# Patient Record
Sex: FEMALE | Race: OTHER | HISPANIC OR LATINO
[De-identification: names, ages, dates, MRNs, and addresses within clinical notes are randomized per-mention and may not be internally consistent; named-entity substitution may affect disease eponyms.]

---

## 2018-10-21 ENCOUNTER — TRANSCRIPTION ENCOUNTER (OUTPATIENT)
Age: 43
End: 2018-10-21

## 2020-03-12 ENCOUNTER — TRANSCRIPTION ENCOUNTER (OUTPATIENT)
Age: 45
End: 2020-03-12

## 2020-10-12 ENCOUNTER — TRANSCRIPTION ENCOUNTER (OUTPATIENT)
Age: 45
End: 2020-10-12

## 2021-03-02 ENCOUNTER — TRANSCRIPTION ENCOUNTER (OUTPATIENT)
Age: 46
End: 2021-03-02

## 2021-05-07 ENCOUNTER — NON-APPOINTMENT (OUTPATIENT)
Age: 46
End: 2021-05-07

## 2021-05-07 ENCOUNTER — APPOINTMENT (OUTPATIENT)
Dept: OPHTHALMOLOGY | Facility: CLINIC | Age: 46
End: 2021-05-07
Payer: COMMERCIAL

## 2021-05-07 PROCEDURE — 99204 OFFICE O/P NEW MOD 45 MIN: CPT

## 2021-05-07 PROCEDURE — 99072 ADDL SUPL MATRL&STAF TM PHE: CPT

## 2021-05-27 ENCOUNTER — APPOINTMENT (OUTPATIENT)
Dept: OPHTHALMOLOGY | Facility: CLINIC | Age: 46
End: 2021-05-27

## 2021-06-25 ENCOUNTER — TRANSCRIPTION ENCOUNTER (OUTPATIENT)
Age: 46
End: 2021-06-25

## 2021-08-12 ENCOUNTER — TRANSCRIPTION ENCOUNTER (OUTPATIENT)
Age: 46
End: 2021-08-12

## 2021-08-25 ENCOUNTER — TRANSCRIPTION ENCOUNTER (OUTPATIENT)
Age: 46
End: 2021-08-25

## 2021-09-15 ENCOUNTER — TRANSCRIPTION ENCOUNTER (OUTPATIENT)
Age: 46
End: 2021-09-15

## 2022-08-05 ENCOUNTER — NON-APPOINTMENT (OUTPATIENT)
Age: 47
End: 2022-08-05

## 2022-08-30 PROBLEM — Z00.00 ENCOUNTER FOR PREVENTIVE HEALTH EXAMINATION: Status: ACTIVE | Noted: 2022-08-30

## 2022-09-22 ENCOUNTER — APPOINTMENT (OUTPATIENT)
Dept: OBGYN | Facility: CLINIC | Age: 47
End: 2022-09-22

## 2022-09-22 VITALS
BODY MASS INDEX: 36.88 KG/M2 | HEIGHT: 67 IN | WEIGHT: 235 LBS | DIASTOLIC BLOOD PRESSURE: 73 MMHG | SYSTOLIC BLOOD PRESSURE: 104 MMHG

## 2022-09-22 PROCEDURE — 99204 OFFICE O/P NEW MOD 45 MIN: CPT

## 2022-09-28 NOTE — PHYSICAL EXAM
[Chaperone Present] : A chaperone was present in the examining room during all aspects of the physical examination [FreeTextEntry1] : chevy [Soft] : soft [Non-tender] : non-tender [FreeTextEntry7] : 12 wks mobile

## 2022-09-28 NOTE — PLAN
[FreeTextEntry1] : Discussed the option of continued conservative observation of fibroids vs surgical removal. Treatment options of myomectomy, hysterectomy, ufe and continued observation were also outlined. A detailed discussion regarding the option of myomectomy vs hysterectomy was also held and the risks, benefits, and alternatives provided. All questions answered had neg bx to schedule TLH BS.\par During this visit 45 minutes were spent face-to-face with greater than 50% of the time dedicated to counseling.\par

## 2022-09-28 NOTE — HISTORY OF PRESENT ILLNESS
[FreeTextEntry1] : 46yo  LMP - presenting for consult for uterine fibroids. She has history of painful and heavy periods. On heavy days she uses a pad every 1 hour soaked. S/p Mirena placement which was expelled and it briefly helped before expulsion with hemorrhage and ED visit in August. Complains of fatigue and anxiety. Would like to have a hysterectomy.\par \par On OCP still bleeding (Nestrellis) \par EMBx(): proliferative endometrium\par pap smear : NILM\par \par MRI ([facility]) 22: Ut 7x7x8.6cm. Multiple uterine fibroids R posterior body 3x4.5x3.9cm with 40% submucosal extension. IM fibroid 3.2x2.6x2.0cm, IM fibroid posterior body 1.2x1.9x1.5cm. Junctional zone is 0.7cm\par \par OBHx: D&Cx2, NSVDx3\par GynHx:uterine fibroids, PCOS\par PMSHx: arthroscopic R knee ligament repair, HSV\par Meds:Zyrtex, Phentermine (irregular use), Fe, Valtrex, OCP \par All: Dust\par Soc:  and is sexually active with her ;  \par FamHx: none pertinent\par Accepts blood transfusion\par

## 2022-10-14 ENCOUNTER — NON-APPOINTMENT (OUTPATIENT)
Age: 47
End: 2022-10-14

## 2022-10-19 ENCOUNTER — OUTPATIENT (OUTPATIENT)
Dept: OUTPATIENT SERVICES | Facility: HOSPITAL | Age: 47
LOS: 1 days | End: 2022-10-19
Payer: COMMERCIAL

## 2022-10-19 VITALS
OXYGEN SATURATION: 100 % | TEMPERATURE: 98 F | SYSTOLIC BLOOD PRESSURE: 109 MMHG | RESPIRATION RATE: 18 BRPM | WEIGHT: 242.07 LBS | HEART RATE: 79 BPM | HEIGHT: 68 IN | DIASTOLIC BLOOD PRESSURE: 75 MMHG

## 2022-10-19 DIAGNOSIS — Z98.890 OTHER SPECIFIED POSTPROCEDURAL STATES: Chronic | ICD-10-CM

## 2022-10-19 DIAGNOSIS — Z97.5 PRESENCE OF (INTRAUTERINE) CONTRACEPTIVE DEVICE: Chronic | ICD-10-CM

## 2022-10-19 DIAGNOSIS — D25.9 LEIOMYOMA OF UTERUS, UNSPECIFIED: ICD-10-CM

## 2022-10-19 DIAGNOSIS — Z01.818 ENCOUNTER FOR OTHER PREPROCEDURAL EXAMINATION: ICD-10-CM

## 2022-10-19 LAB
ANION GAP SERPL CALC-SCNC: 12 MMOL/L — SIGNIFICANT CHANGE UP (ref 5–17)
BLD GP AB SCN SERPL QL: NEGATIVE — SIGNIFICANT CHANGE UP
BUN SERPL-MCNC: 12 MG/DL — SIGNIFICANT CHANGE UP (ref 7–23)
CALCIUM SERPL-MCNC: 9 MG/DL — SIGNIFICANT CHANGE UP (ref 8.4–10.5)
CHLORIDE SERPL-SCNC: 104 MMOL/L — SIGNIFICANT CHANGE UP (ref 96–108)
CO2 SERPL-SCNC: 22 MMOL/L — SIGNIFICANT CHANGE UP (ref 22–31)
CREAT SERPL-MCNC: 0.74 MG/DL — SIGNIFICANT CHANGE UP (ref 0.5–1.3)
EGFR: 102 ML/MIN/1.73M2 — SIGNIFICANT CHANGE UP
GLUCOSE SERPL-MCNC: 130 MG/DL — HIGH (ref 70–99)
HCT VFR BLD CALC: 27.6 % — LOW (ref 34.5–45)
HGB BLD-MCNC: 8.6 G/DL — LOW (ref 11.5–15.5)
MCHC RBC-ENTMCNC: 27.4 PG — SIGNIFICANT CHANGE UP (ref 27–34)
MCHC RBC-ENTMCNC: 31.2 GM/DL — LOW (ref 32–36)
MCV RBC AUTO: 87.9 FL — SIGNIFICANT CHANGE UP (ref 80–100)
NRBC # BLD: 0 /100 WBCS — SIGNIFICANT CHANGE UP (ref 0–0)
PLATELET # BLD AUTO: 328 K/UL — SIGNIFICANT CHANGE UP (ref 150–400)
POTASSIUM SERPL-MCNC: 3.9 MMOL/L — SIGNIFICANT CHANGE UP (ref 3.5–5.3)
POTASSIUM SERPL-SCNC: 3.9 MMOL/L — SIGNIFICANT CHANGE UP (ref 3.5–5.3)
RBC # BLD: 3.14 M/UL — LOW (ref 3.8–5.2)
RBC # FLD: 13.1 % — SIGNIFICANT CHANGE UP (ref 10.3–14.5)
RH IG SCN BLD-IMP: POSITIVE — SIGNIFICANT CHANGE UP
SODIUM SERPL-SCNC: 138 MMOL/L — SIGNIFICANT CHANGE UP (ref 135–145)
WBC # BLD: 8.64 K/UL — SIGNIFICANT CHANGE UP (ref 3.8–10.5)
WBC # FLD AUTO: 8.64 K/UL — SIGNIFICANT CHANGE UP (ref 3.8–10.5)

## 2022-10-19 PROCEDURE — 36415 COLL VENOUS BLD VENIPUNCTURE: CPT

## 2022-10-19 PROCEDURE — 86901 BLOOD TYPING SEROLOGIC RH(D): CPT

## 2022-10-19 PROCEDURE — 80048 BASIC METABOLIC PNL TOTAL CA: CPT

## 2022-10-19 PROCEDURE — 85027 COMPLETE CBC AUTOMATED: CPT

## 2022-10-19 PROCEDURE — G0463: CPT

## 2022-10-19 PROCEDURE — 83036 HEMOGLOBIN GLYCOSYLATED A1C: CPT

## 2022-10-19 PROCEDURE — 86850 RBC ANTIBODY SCREEN: CPT

## 2022-10-19 PROCEDURE — 86900 BLOOD TYPING SEROLOGIC ABO: CPT

## 2022-10-19 RX ORDER — CEFOTETAN DISODIUM 1 G
2 VIAL (EA) INJECTION ONCE
Refills: 0 | Status: DISCONTINUED | OUTPATIENT
Start: 2022-11-07 | End: 2022-11-21

## 2022-10-19 NOTE — H&P PST ADULT - PROBLEM SELECTOR PLAN 1
Preop instructions and chlorhexidine soap given   Labs CBC BMP A1c  T&S performed in PST   Covid test on 11/4  @ [facility]

## 2022-10-19 NOTE — H&P PST ADULT - NSICDXPASTMEDICALHX_GEN_ALL_CORE_FT
PAST MEDICAL HISTORY:  Herpes     JOVANA (iron deficiency anemia)      PAST MEDICAL HISTORY:  Herpes     JOVANA (iron deficiency anemia)     Obesity

## 2022-10-19 NOTE — H&P PST ADULT - VENOUS THROMBOEMBOLISM HISTORY
[Time Spent: ___ minutes] : I have spent [unfilled] minutes of time on the encounter. (0) indicator not present

## 2022-10-19 NOTE — H&P PST ADULT - NSICDXPASTSURGICALHX_GEN_ALL_CORE_FT
PAST SURGICAL HISTORY:  H/O arthroscopy of right knee 2016    Presence of IUD expelled itself     PAST SURGICAL HISTORY:  H/O arthroscopy of right knee 2016    History of D&C     Presence of IUD expelled itself

## 2022-10-19 NOTE — H&P PST ADULT - HISTORY OF PRESENT ILLNESS
Thi is a 47 year old female with past medical history of heavy menses.      Presenting to [facility] for scheduled for laparoscopic total hysterectomy, laparoscopic bilateral salingectomy on 11/7/22 with Dr. Diallo      **Covid test on 11/4 @ [facility]  **  Lizette is a 47 year old female with past medical history of JOVANA. Reports having  heavy painful menses for several years which are progressively worsening. Ultrasound performed revealed multiple fibroids.  Presenting to [facility] for scheduled for laparoscopic total hysterectomy, laparoscopic bilateral salingectomy on 11/7/22 with Dr. Diallo      **Covid test on 11/4 @ [facility]  ** Covid vaccinated  ** Reports having Covid Jan 2022- Mild Symptoms  Thi is a 47 year old female with past medical history of JOVANA. Reports having heavy painful menses for several years which are progressively worsening. Ultrasound performed revealed multiple fibroids.  Presenting to [facility] for scheduled for laparoscopic total hysterectomy, laparoscopic bilateral salingectomy on 11/7/22 with Dr. Jaquez      **Covid test on 11/4 @ [facility]  ** Covid vaccinated  ** Reports having Covid Jan 2022- Mild Symptoms

## 2022-10-19 NOTE — H&P PST ADULT - NSANTHOSAYNRD_GEN_A_CORE
Colposcopy    Pap = LGSIL  Had colpo 15 years ago  I dw pt slow progression of dysplasia and screening for cancerous or precancerous cells of the cervix. Patient was positioned in stir-ups. She was consented for colposcopy and possible biopsies.  I dis
No. JUSTIN screening performed.  STOP BANG Legend: 0-2 = LOW Risk; 3-4 = INTERMEDIATE Risk; 5-8 = HIGH Risk

## 2022-10-20 LAB
A1C WITH ESTIMATED AVERAGE GLUCOSE RESULT: 5.2 % — SIGNIFICANT CHANGE UP (ref 4–5.6)
ESTIMATED AVERAGE GLUCOSE: 103 MG/DL — SIGNIFICANT CHANGE UP (ref 68–114)

## 2022-10-24 PROBLEM — E66.9 OBESITY, UNSPECIFIED: Chronic | Status: ACTIVE | Noted: 2022-10-19

## 2022-10-24 PROBLEM — B00.9 HERPESVIRAL INFECTION, UNSPECIFIED: Chronic | Status: ACTIVE | Noted: 2022-10-19

## 2022-10-24 PROBLEM — D50.9 IRON DEFICIENCY ANEMIA, UNSPECIFIED: Chronic | Status: ACTIVE | Noted: 2022-10-19

## 2022-11-04 ENCOUNTER — OUTPATIENT (OUTPATIENT)
Dept: OUTPATIENT SERVICES | Facility: HOSPITAL | Age: 47
LOS: 1 days | End: 2022-11-04
Payer: COMMERCIAL

## 2022-11-04 DIAGNOSIS — Z98.890 OTHER SPECIFIED POSTPROCEDURAL STATES: Chronic | ICD-10-CM

## 2022-11-04 DIAGNOSIS — Z97.5 PRESENCE OF (INTRAUTERINE) CONTRACEPTIVE DEVICE: Chronic | ICD-10-CM

## 2022-11-04 DIAGNOSIS — Z11.52 ENCOUNTER FOR SCREENING FOR COVID-19: ICD-10-CM

## 2022-11-04 LAB — SARS-COV-2 RNA SPEC QL NAA+PROBE: SIGNIFICANT CHANGE UP

## 2022-11-04 PROCEDURE — C9803: CPT

## 2022-11-04 PROCEDURE — U0005: CPT

## 2022-11-04 PROCEDURE — U0003: CPT

## 2022-11-06 ENCOUNTER — TRANSCRIPTION ENCOUNTER (OUTPATIENT)
Age: 47
End: 2022-11-06

## 2022-11-07 ENCOUNTER — RESULT REVIEW (OUTPATIENT)
Age: 47
End: 2022-11-07

## 2022-11-07 ENCOUNTER — OUTPATIENT (OUTPATIENT)
Dept: OUTPATIENT SERVICES | Facility: HOSPITAL | Age: 47
LOS: 1 days | End: 2022-11-07
Payer: COMMERCIAL

## 2022-11-07 ENCOUNTER — APPOINTMENT (OUTPATIENT)
Dept: OBGYN | Facility: HOSPITAL | Age: 47
End: 2022-11-07

## 2022-11-07 ENCOUNTER — TRANSCRIPTION ENCOUNTER (OUTPATIENT)
Age: 47
End: 2022-11-07

## 2022-11-07 VITALS
DIASTOLIC BLOOD PRESSURE: 68 MMHG | OXYGEN SATURATION: 98 % | TEMPERATURE: 98 F | HEART RATE: 72 BPM | HEIGHT: 68 IN | RESPIRATION RATE: 18 BRPM | WEIGHT: 242.07 LBS | SYSTOLIC BLOOD PRESSURE: 100 MMHG

## 2022-11-07 VITALS
RESPIRATION RATE: 17 BRPM | DIASTOLIC BLOOD PRESSURE: 66 MMHG | HEART RATE: 72 BPM | TEMPERATURE: 98 F | OXYGEN SATURATION: 98 % | SYSTOLIC BLOOD PRESSURE: 114 MMHG

## 2022-11-07 DIAGNOSIS — Z98.890 OTHER SPECIFIED POSTPROCEDURAL STATES: Chronic | ICD-10-CM

## 2022-11-07 DIAGNOSIS — Z97.5 PRESENCE OF (INTRAUTERINE) CONTRACEPTIVE DEVICE: Chronic | ICD-10-CM

## 2022-11-07 DIAGNOSIS — D25.9 LEIOMYOMA OF UTERUS, UNSPECIFIED: ICD-10-CM

## 2022-11-07 LAB
GLUCOSE BLDC GLUCOMTR-MCNC: 90 MG/DL — SIGNIFICANT CHANGE UP (ref 70–99)
HCG UR QL: NEGATIVE — SIGNIFICANT CHANGE UP

## 2022-11-07 PROCEDURE — C1889: CPT

## 2022-11-07 PROCEDURE — C9399: CPT

## 2022-11-07 PROCEDURE — 58571 TLH W/T/O 250 G OR LESS: CPT

## 2022-11-07 PROCEDURE — 81025 URINE PREGNANCY TEST: CPT

## 2022-11-07 PROCEDURE — 82962 GLUCOSE BLOOD TEST: CPT

## 2022-11-07 PROCEDURE — 88307 TISSUE EXAM BY PATHOLOGIST: CPT | Mod: 26

## 2022-11-07 PROCEDURE — 88302 TISSUE EXAM BY PATHOLOGIST: CPT

## 2022-11-07 PROCEDURE — 88302 TISSUE EXAM BY PATHOLOGIST: CPT | Mod: 26

## 2022-11-07 PROCEDURE — 88307 TISSUE EXAM BY PATHOLOGIST: CPT

## 2022-11-07 DEVICE — ARISTA 3GR
Type: IMPLANTABLE DEVICE | Status: NON-FUNCTIONAL
Removed: 2022-11-07

## 2022-11-07 RX ORDER — CHLORHEXIDINE GLUCONATE 213 G/1000ML
1 SOLUTION TOPICAL ONCE
Refills: 0 | Status: DISCONTINUED | OUTPATIENT
Start: 2022-11-07 | End: 2022-11-07

## 2022-11-07 RX ORDER — SODIUM CHLORIDE 9 MG/ML
3 INJECTION INTRAMUSCULAR; INTRAVENOUS; SUBCUTANEOUS EVERY 8 HOURS
Refills: 0 | Status: DISCONTINUED | OUTPATIENT
Start: 2022-11-07 | End: 2022-11-07

## 2022-11-07 RX ORDER — HYDROMORPHONE HYDROCHLORIDE 2 MG/ML
0.5 INJECTION INTRAMUSCULAR; INTRAVENOUS; SUBCUTANEOUS
Refills: 0 | Status: DISCONTINUED | OUTPATIENT
Start: 2022-11-07 | End: 2022-11-07

## 2022-11-07 RX ORDER — HYDROMORPHONE HYDROCHLORIDE 2 MG/ML
0.25 INJECTION INTRAMUSCULAR; INTRAVENOUS; SUBCUTANEOUS
Refills: 0 | Status: DISCONTINUED | OUTPATIENT
Start: 2022-11-07 | End: 2022-11-07

## 2022-11-07 RX ORDER — OXYCODONE HYDROCHLORIDE 5 MG/1
5 TABLET ORAL ONCE
Refills: 0 | Status: DISCONTINUED | OUTPATIENT
Start: 2022-11-07 | End: 2022-11-07

## 2022-11-07 RX ORDER — SODIUM CHLORIDE 9 MG/ML
1000 INJECTION, SOLUTION INTRAVENOUS
Refills: 0 | Status: DISCONTINUED | OUTPATIENT
Start: 2022-11-07 | End: 2022-11-21

## 2022-11-07 RX ORDER — ACETAMINOPHEN 500 MG
975 TABLET ORAL EVERY 6 HOURS
Refills: 0 | Status: DISCONTINUED | OUTPATIENT
Start: 2022-11-07 | End: 2022-11-21

## 2022-11-07 RX ORDER — ONDANSETRON 8 MG/1
4 TABLET, FILM COATED ORAL ONCE
Refills: 0 | Status: DISCONTINUED | OUTPATIENT
Start: 2022-11-07 | End: 2022-11-07

## 2022-11-07 RX ORDER — ACETAMINOPHEN 500 MG
1000 TABLET ORAL ONCE
Refills: 0 | Status: COMPLETED | OUTPATIENT
Start: 2022-11-07 | End: 2022-11-07

## 2022-11-07 RX ORDER — ACETAMINOPHEN 500 MG
3 TABLET ORAL
Qty: 0 | Refills: 0 | DISCHARGE
Start: 2022-11-07

## 2022-11-07 RX ORDER — OXYCODONE HYDROCHLORIDE 5 MG/1
5 TABLET ORAL EVERY 6 HOURS
Refills: 0 | Status: DISCONTINUED | OUTPATIENT
Start: 2022-11-07 | End: 2022-11-07

## 2022-11-07 RX ORDER — OXYCODONE HYDROCHLORIDE 5 MG/1
1 TABLET ORAL
Qty: 6 | Refills: 0
Start: 2022-11-07

## 2022-11-07 RX ORDER — GABAPENTIN 400 MG/1
600 CAPSULE ORAL ONCE
Refills: 0 | Status: COMPLETED | OUTPATIENT
Start: 2022-11-07 | End: 2022-11-07

## 2022-11-07 RX ORDER — VALACYCLOVIR 500 MG/1
1 TABLET, FILM COATED ORAL
Qty: 0 | Refills: 0 | DISCHARGE

## 2022-11-07 RX ORDER — FERROUS SULFATE 325(65) MG
0 TABLET ORAL
Qty: 0 | Refills: 0 | DISCHARGE

## 2022-11-07 RX ORDER — CELECOXIB 200 MG/1
400 CAPSULE ORAL ONCE
Refills: 0 | Status: COMPLETED | OUTPATIENT
Start: 2022-11-07 | End: 2022-11-07

## 2022-11-07 RX ORDER — IBUPROFEN 200 MG
600 TABLET ORAL EVERY 6 HOURS
Refills: 0 | Status: DISCONTINUED | OUTPATIENT
Start: 2022-11-07 | End: 2022-11-21

## 2022-11-07 RX ORDER — LIDOCAINE HCL 20 MG/ML
0.2 VIAL (ML) INJECTION ONCE
Refills: 0 | Status: DISCONTINUED | OUTPATIENT
Start: 2022-11-07 | End: 2022-11-07

## 2022-11-07 RX ORDER — IBUPROFEN 200 MG
1 TABLET ORAL
Qty: 0 | Refills: 0 | DISCHARGE
Start: 2022-11-07

## 2022-11-07 RX ADMIN — Medication 1000 MILLIGRAM(S): at 10:07

## 2022-11-07 RX ADMIN — CELECOXIB 400 MILLIGRAM(S): 200 CAPSULE ORAL at 10:07

## 2022-11-07 RX ADMIN — Medication 600 MILLIGRAM(S): at 18:40

## 2022-11-07 RX ADMIN — Medication 600 MILLIGRAM(S): at 18:04

## 2022-11-07 RX ADMIN — GABAPENTIN 600 MILLIGRAM(S): 400 CAPSULE ORAL at 10:08

## 2022-11-07 RX ADMIN — Medication 1000 MILLIGRAM(S): at 16:30

## 2022-11-07 RX ADMIN — Medication 400 MILLIGRAM(S): at 16:18

## 2022-11-07 NOTE — ASU DISCHARGE PLAN (ADULT/PEDIATRIC) - ASU DC SPECIAL INSTRUCTIONSFT
Return to your regular way of eating.  Resume normal activity as tolerated, but no heavy lifting or strenuous activity for 2 weeks.  No driving for next 2 weeks and/or while on narcotic pain medication.  Complete vaginal rest, no tampons, no douching, no tub bathing, no sexual activities for 2 weeks unless otherwise instructed by your doctor.  Call your doctor with any signs and symptoms of infection such as fever, chills, nausea or vomiting.  Call your doctor with redness or swelling at the incision site, fluid leakage or wound separation.  Call your doctor if you're unable to tolerate food or have difficulty urinating.  Call your doctor if you have pain that is not relieved by your prescribed medications.  Notify your doctor with any other concerns.  Follow up with Dr. Jaquez at your scheduled postop appointment.

## 2022-11-07 NOTE — CHART NOTE - NSCHARTNOTEFT_GEN_A_CORE
1700    R1 GYN POST-OP CHECK NOTE    SUBJECTIVE:    48yo F now POD0 s/p TLH, BS. Pt reports pain well controlled by pain meds. Out of bed and in chair. Not yet taken in PO (desires to drink). Not yet voided, but feeling the urge to. She denies fever, chills, nausea, vomiting, headache, dizziness, chest pain, palpitations, shortness of breath.    acetaminophen     Tablet .. 975 milliGRAM(s) Oral every 6 hours  cefoTEtan  IVPB 2 Gram(s) IV Intermittent once  HYDROmorphone  Injectable 0.25 milliGRAM(s) IV Push every 10 minutes PRN  ibuprofen  Tablet. 600 milliGRAM(s) Oral every 6 hours  lactated ringers. 1000 milliLiter(s) IV Continuous <Continuous>  ondansetron Injectable 4 milliGRAM(s) IV Push once PRN  oxyCODONE    IR 5 milliGRAM(s) Oral every 6 hours PRN  oxyCODONE    IR 5 milliGRAM(s) Oral once PRN      OBJECTIVE:    VITAL SIGNS:  Vital Signs Last 24 Hrs  T(C): 36.5 (07 Nov 2022 16:00), Max: 36.8 (07 Nov 2022 09:03)  T(F): 97.7 (07 Nov 2022 16:00), Max: 98.2 (07 Nov 2022 09:03)  HR: 79 (07 Nov 2022 17:00) (60 - 79)  BP: 102/58 (07 Nov 2022 17:00) (94/51 - 114/59)  BP(mean): 75 (07 Nov 2022 17:00) (68 - 78)  RR: 15 (07 Nov 2022 17:00) (14 - 18)  SpO2: 100% (07 Nov 2022 17:00) (95% - 100%)    Parameters below as of 07 Nov 2022 17:00  Patient On (Oxygen Delivery Method): room air      CAPILLARY BLOOD GLUCOSE      POCT Blood Glucose.: 90 mg/dL (07 Nov 2022 08:58)      11-07-22 @ 07:01  -  11-07-22 @ 17:08  --------------------------------------------------------  IN: 475 mL / OUT: 0 mL / NET: 475 mL        PHYSICAL EXAM:  GEN: No acute distress. Sitting up in chair  CV: Regular rate and rhythm. No murmurs appreciated   LUNGS: Clear to auscultation anteriorly bilaterally. No respiratory distress  ABD: Soft. Non-tender.   Incision: Lsc x4 c/d/i w/ overlying steri-strips  EXT: No calf tenderness bilaterally    LABS:            ASSESSMENT:  48yo F now POD0 s/p TLH and BS. Patient is stable and progressing normally postoperatively.    NEURO: Pain controlled on current regimen  CV: Hemodynamically stable  PULM: Saturating well on RA. Using ICS  GI: Advance diet as tolerated  : Due to void  HEME: Early ambulation  ID: Afebrile  Dispo: Home after voiding and tolerating PO    Bolivar Bernardo, PGY-1  Obstetrics and Gynecology    d/w Dr. Jaquez

## 2022-11-07 NOTE — BRIEF OPERATIVE NOTE - NSICDXBRIEFPROCEDURE_GEN_ALL_CORE_FT
PROCEDURES:  Laparoscopic total hysterectomy with salpingectomy for uterus 250 grams or less 07-Nov-2022 15:12:55  Magdalena Zhang  Salpingectomy, bilateral, total, laparoscopic 07-Nov-2022 15:13:06  Magdalena Zhang

## 2022-11-07 NOTE — ASU PATIENT PROFILE, ADULT - NSICDXPASTSURGICALHX_GEN_ALL_CORE_FT
PAST SURGICAL HISTORY:  H/O arthroscopy of right knee 2016    History of D&C     Presence of IUD expelled itself

## 2022-11-07 NOTE — ASU DISCHARGE PLAN (ADULT/PEDIATRIC) - CARE PROVIDER_API CALL
Robbie Jaquez)  Obstetrics and Gynecology  [address] [place] [address], [address]  [place] [address]  Phone: [phone]  Fax: [phone]  Follow Up Time:

## 2022-11-07 NOTE — BRIEF OPERATIVE NOTE - OPERATION/FINDINGS
EUA: ~10w sized uterus. Mobile, bulky contour. No palpable masses bilaterally adnexally.   Intra-abdominal findings: Grossly normal upper abd survey including liver, omentum, bowel, and appendix. Bulky fibroid uterus. Grossly normal bilateral fallopian tubes and ovaries. Ureters visualized transperitoneally far from surgical field.

## 2022-11-07 NOTE — ASU DISCHARGE PLAN (ADULT/PEDIATRIC) - NS MD DC FALL RISK RISK
For information on Fall & Injury Prevention, visit: [email or web address] OR  [email or web address] OR  https://www.cdc.gov/steadi/patient.html

## 2022-11-11 LAB — SURGICAL PATHOLOGY STUDY: SIGNIFICANT CHANGE UP

## 2022-11-16 ENCOUNTER — NON-APPOINTMENT (OUTPATIENT)
Age: 47
End: 2022-11-16

## 2022-11-17 ENCOUNTER — APPOINTMENT (OUTPATIENT)
Dept: OBGYN | Facility: CLINIC | Age: 47
End: 2022-11-17

## 2022-11-17 VITALS
DIASTOLIC BLOOD PRESSURE: 60 MMHG | HEIGHT: 67 IN | BODY MASS INDEX: 37.35 KG/M2 | WEIGHT: 238 LBS | HEART RATE: 90 BPM | SYSTOLIC BLOOD PRESSURE: 93 MMHG

## 2022-11-17 PROCEDURE — 99024 POSTOP FOLLOW-UP VISIT: CPT

## 2022-11-29 ENCOUNTER — APPOINTMENT (OUTPATIENT)
Dept: OBGYN | Facility: CLINIC | Age: 47
End: 2022-11-29

## 2022-11-29 VITALS
WEIGHT: 240 LBS | HEART RATE: 78 BPM | SYSTOLIC BLOOD PRESSURE: 100 MMHG | BODY MASS INDEX: 37.67 KG/M2 | HEIGHT: 67 IN | DIASTOLIC BLOOD PRESSURE: 70 MMHG

## 2022-11-29 PROCEDURE — 99024 POSTOP FOLLOW-UP VISIT: CPT

## 2022-11-29 NOTE — HISTORY OF PRESENT ILLNESS
[Pain is well-controlled] : pain is well-controlled [Fever] : no fever [Chills] : no chills [Nausea] : no nausea [Vomiting] : no vomiting [Diarrhea] : no diarrhea [Vaginal Bleeding] : no vaginal bleeding [Pelvic Pressure] : no pelvic pressure [Dysuria] : no dysuria [Vaginal Discharge] : no vaginal discharge [Constipation] : no constipation [None] : no vaginal bleeding [Pathology reviewed] : pathology reviewed [de-identified] : Returns today to evaluate assistant port incision. L assistant port incision popped open with a large sneeze a few weeks back. Otherwise feels well and denies sx. Has been using hydrogen peroxide on incisions [de-identified] : Incisions well healed. L assistant port with healthy granulation tissue in the process of healin no erythema/edema.

## 2022-11-29 NOTE — HISTORY OF PRESENT ILLNESS
[Pain is well-controlled] : pain is well-controlled [Fever] : no fever [Chills] : no chills [Nausea] : no nausea [Vomiting] : no vomiting [Clean/Dry/Intact] : clean, dry and intact [Erythema] : not erythematous [None] : no vaginal bleeding [Normal] : normal [Pathology reviewed] : pathology reviewed [de-identified] : left incision with sl separation no drainage no erythema

## 2022-11-29 NOTE — PLAN
[FreeTextEntry1] : RTO 2-3 weeks for cuff check\par c/w peroxide and incision care as needed at home

## 2022-11-29 NOTE — ASSESSMENT
[Doing Well] : is doing well [Excellent Pain Control] : has excellent pain control [No Sign of Infection] : is showing no signs of infection [de-identified] : Doing well postop with healing incisions.

## 2022-12-22 ENCOUNTER — APPOINTMENT (OUTPATIENT)
Dept: OBGYN | Facility: CLINIC | Age: 47
End: 2022-12-22

## 2023-01-25 ENCOUNTER — APPOINTMENT (OUTPATIENT)
Dept: OBGYN | Facility: CLINIC | Age: 48
End: 2023-01-25
Payer: COMMERCIAL

## 2023-01-25 VITALS
WEIGHT: 240 LBS | DIASTOLIC BLOOD PRESSURE: 72 MMHG | HEART RATE: 75 BPM | HEIGHT: 67 IN | SYSTOLIC BLOOD PRESSURE: 112 MMHG | BODY MASS INDEX: 37.67 KG/M2

## 2023-01-25 PROCEDURE — 99024 POSTOP FOLLOW-UP VISIT: CPT

## 2023-01-25 NOTE — HISTORY OF PRESENT ILLNESS
[Pain is well-controlled] : pain is well-controlled [Fever] : no fever [Chills] : no chills [Nausea] : no nausea [Vomiting] : no vomiting [Clean/Dry/Intact] : clean, dry and intact [Erythema] : not erythematous [None] : no vaginal bleeding [Normal] : normal [Pathology reviewed] : pathology reviewed [de-identified] : Patient is 10 weeks status post total laparoscopic hysterectomy bilateral salpingectomy she is doing well she reports some mild periumbilical pain and an episode of staining also complains of some episodes of hair loss she denies any hot flashes or any other symptomatology [de-identified] : Her abdomen was soft there is some tenderness at the umbilicus there was no mass no evidence of hernia

## 2023-01-25 NOTE — PLAN
[FreeTextEntry1] : Patient to notify if continued hair loss and increased umbilical pain has been cleared for return to normal activities her cuff was completely intact

## 2023-09-17 ENCOUNTER — NON-APPOINTMENT (OUTPATIENT)
Age: 48
End: 2023-09-17

## 2023-12-10 ENCOUNTER — NON-APPOINTMENT (OUTPATIENT)
Age: 48
End: 2023-12-10

## 2024-08-24 ENCOUNTER — NON-APPOINTMENT (OUTPATIENT)
Age: 49
End: 2024-08-24

## 2025-01-12 ENCOUNTER — NON-APPOINTMENT (OUTPATIENT)
Age: 50
End: 2025-01-12

## 2025-05-30 ENCOUNTER — NON-APPOINTMENT (OUTPATIENT)
Age: 50
End: 2025-05-30

## 2025-09-01 ENCOUNTER — NON-APPOINTMENT (OUTPATIENT)
Age: 50
End: 2025-09-01

## (undated) DEVICE — VENODYNE/SCD SLEEVE CALF LARGE

## (undated) DEVICE — BLADE SCALPEL SAFETYLOCK #10

## (undated) DEVICE — ELCTR BOVIE PENCIL SMOKE EVACUATION

## (undated) DEVICE — MARKING PEN W RULER

## (undated) DEVICE — VALVE YELLOW PORT SEAL PLUS 5MM

## (undated) DEVICE — DRSG STERISTRIPS 0.5 X 4"

## (undated) DEVICE — OLYMPUS PK SPATULA 5MM

## (undated) DEVICE — UTERINE MANIPULATOR CONMED VCARE MED 34MM

## (undated) DEVICE — PREP CHLORAPREP HI-LITE ORANGE 26ML

## (undated) DEVICE — GOWN TRIMAX LG

## (undated) DEVICE — TROCAR APPLIED MEDICAL KII BALLOON BLUNT TIP 12MM X 100MM

## (undated) DEVICE — TROCAR COVIDIEN BLUNT TIP HASSAN 10MM

## (undated) DEVICE — UTERINE MANIPULATOR CLINICAL INNOVATIONS CLEARVIEW 7CM

## (undated) DEVICE — TROCAR GELPOINT MINI ADVANCED

## (undated) DEVICE — UTERINE MANIPULATOR CONMED VCARE LG 37MM

## (undated) DEVICE — DRAPE 3/4 SHEET W REINFORCEMENT 56X77"

## (undated) DEVICE — SUT BIOSYN 4-0 18" P-12

## (undated) DEVICE — GLV 7 PROTEXIS (WHITE)

## (undated) DEVICE — SPECIMEN CONTAINER 100ML

## (undated) DEVICE — SOL IRR POUR NS 0.9% 500ML

## (undated) DEVICE — RUMI TIP BLUE 6.7MM X 8CM

## (undated) DEVICE — MEDICATION LABELS W MARKER

## (undated) DEVICE — TUBING STRYKEFLOW II SUCTION / IRRIGATOR

## (undated) DEVICE — SUT VLOC 180 0 12" GS-21 GREEN

## (undated) DEVICE — FOLEY TRAY 16FR LF URINE METER SURESTEP

## (undated) DEVICE — TUBING TUR 2 PRONG

## (undated) DEVICE — PREP BETADINE SPONGE STICKS

## (undated) DEVICE — SOL IRR POUR H2O 250ML

## (undated) DEVICE — SUT VLOC 180 0 9" GS-21 GREEN

## (undated) DEVICE — UTERINE MANIPULATOR CONMED VCARE SM 32MM

## (undated) DEVICE — APPLICATOR VISTASEAL LAP DUAL 35CM RIGID

## (undated) DEVICE — INSUFFLATION NDL COVIDIEN STEP 14G FOR STEP/VERSASTEP

## (undated) DEVICE — SUT VLOC 180 0 18" GS-21 GREEN

## (undated) DEVICE — DRAPE TOWEL BLUE 17" X 24"

## (undated) DEVICE — DRAPE MAYO STAND 30"

## (undated) DEVICE — SYR LUER LOK 10CC

## (undated) DEVICE — DRAPE INSTRUMENT POUCH 6.75" X 11"

## (undated) DEVICE — LIGASURE BLUNT TIP 37CM

## (undated) DEVICE — NDL HYPO REGULAR BEVEL 22G X 1.5" (TURQUOISE)

## (undated) DEVICE — APPLICATOR FOR ARISTA XL 38CM

## (undated) DEVICE — WARMING BLANKET UPPER ADULT

## (undated) DEVICE — TUBING INSUFFLATION LAP FILTER 10FT

## (undated) DEVICE — ENDOCATCH II 15MM

## (undated) DEVICE — TROCAR COVIDIEN VERSASTEP PLUS 15MM STANDARD

## (undated) DEVICE — UTERINE MANIPULATOR COOPER SURGICAL 5MM 33CM GREEN

## (undated) DEVICE — POSITIONER FOAM EGG CRATE ULNAR 2PCS (PINK)

## (undated) DEVICE — TROCAR COVIDIEN VERSASTEP PLUS 12MM STANDARD

## (undated) DEVICE — PACK GYN LAPAROSCOPY

## (undated) DEVICE — ENDOCATCH 10MM SPECIMEN POUCH

## (undated) DEVICE — GLV 6.5 PROTEXIS (WHITE)

## (undated) DEVICE — BLADE SCALPEL SAFETYLOCK #15

## (undated) DEVICE — TROCAR COVIDIEN VERSASTEP PLUS 11MM STANDARD

## (undated) DEVICE — GLV 7.5 PROTEXIS (WHITE)

## (undated) DEVICE — SUT POLYSORB 0 30" GS-23

## (undated) DEVICE — Device

## (undated) DEVICE — DRAPE LIGHT HANDLE COVER (BLUE)